# Patient Record
Sex: MALE | Race: WHITE | NOT HISPANIC OR LATINO | Employment: FULL TIME | URBAN - METROPOLITAN AREA
[De-identification: names, ages, dates, MRNs, and addresses within clinical notes are randomized per-mention and may not be internally consistent; named-entity substitution may affect disease eponyms.]

---

## 2024-06-29 PROBLEM — L72.3 SEBACEOUS CYST: Status: ACTIVE | Noted: 2024-06-29

## 2024-06-29 PROBLEM — Z86.59 HISTORY OF DEPRESSION: Status: ACTIVE | Noted: 2024-06-29

## 2024-06-29 PROBLEM — R22.2 MASS OF CHEST WALL, RIGHT: Status: ACTIVE | Noted: 2024-06-29

## 2024-06-29 PROBLEM — Z86.59 HISTORY OF ADHD: Status: ACTIVE | Noted: 2024-06-29

## 2024-06-29 RX ORDER — ACETAMINOPHEN 325 MG/1
TABLET ORAL EVERY 4 HOURS PRN
COMMUNITY

## 2024-06-29 RX ORDER — KETOROLAC TROMETHAMINE 10 MG/1
TABLET, FILM COATED ORAL 2 TIMES DAILY
COMMUNITY
Start: 2022-07-05

## 2024-07-03 ENCOUNTER — HOSPITAL ENCOUNTER (OUTPATIENT)
Dept: RADIOLOGY | Facility: HOSPITAL | Age: 29
Discharge: HOME | End: 2024-07-03
Payer: COMMERCIAL

## 2024-07-03 ENCOUNTER — APPOINTMENT (OUTPATIENT)
Dept: PRIMARY CARE | Facility: CLINIC | Age: 29
End: 2024-07-03
Payer: COMMERCIAL

## 2024-07-03 VITALS
TEMPERATURE: 97.5 F | DIASTOLIC BLOOD PRESSURE: 70 MMHG | RESPIRATION RATE: 16 BRPM | HEIGHT: 71 IN | WEIGHT: 141 LBS | SYSTOLIC BLOOD PRESSURE: 96 MMHG | BODY MASS INDEX: 19.74 KG/M2 | HEART RATE: 59 BPM | OXYGEN SATURATION: 96 %

## 2024-07-03 DIAGNOSIS — M25.522 LEFT ELBOW PAIN: ICD-10-CM
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
## 2024-07-03 DIAGNOSIS — M54.41 ACUTE RIGHT-SIDED LOW BACK PAIN WITH RIGHT-SIDED SCIATICA: ICD-10-CM

## 2024-07-03 DIAGNOSIS — S50.02XA CONTUSION OF LEFT ELBOW, INITIAL ENCOUNTER: ICD-10-CM

## 2024-07-03 DIAGNOSIS — Z23 NEED FOR TDAP VACCINATION: ICD-10-CM

## 2024-07-03 PROCEDURE — 90715 TDAP VACCINE 7 YRS/> IM: CPT | Performed by: FAMILY MEDICINE

## 2024-07-03 PROCEDURE — 90471 IMMUNIZATION ADMIN: CPT | Performed by: FAMILY MEDICINE

## 2024-07-03 PROCEDURE — 72100 X-RAY EXAM L-S SPINE 2/3 VWS: CPT | Performed by: RADIOLOGY

## 2024-07-03 PROCEDURE — 73080 X-RAY EXAM OF ELBOW: CPT | Mod: LT

## 2024-07-03 PROCEDURE — 72100 X-RAY EXAM L-S SPINE 2/3 VWS: CPT

## 2024-07-03 PROCEDURE — 99214 OFFICE O/P EST MOD 30 MIN: CPT | Performed by: FAMILY MEDICINE
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
## 2024-09-24 ENCOUNTER — APPOINTMENT (OUTPATIENT)
Dept: RADIOLOGY | Facility: HOSPITAL | Age: 29
End: 2024-09-24
Payer: COMMERCIAL

## 2024-09-24 ENCOUNTER — HOSPITAL ENCOUNTER (EMERGENCY)
Facility: HOSPITAL | Age: 29
Discharge: HOME | End: 2024-09-24
Attending: EMERGENCY MEDICINE
Payer: COMMERCIAL

## 2024-09-24 VITALS
HEIGHT: 71 IN | SYSTOLIC BLOOD PRESSURE: 119 MMHG | RESPIRATION RATE: 17 BRPM | OXYGEN SATURATION: 98 % | HEART RATE: 62 BPM | TEMPERATURE: 97.2 F | WEIGHT: 140 LBS | BODY MASS INDEX: 19.6 KG/M2 | DIASTOLIC BLOOD PRESSURE: 73 MMHG

## 2024-09-24 DIAGNOSIS — S61.210A LACERATION OF RIGHT INDEX FINGER WITHOUT FOREIGN BODY WITHOUT DAMAGE TO NAIL, INITIAL ENCOUNTER: Primary | ICD-10-CM

## 2024-09-24 PROCEDURE — 2500000005 HC RX 250 GENERAL PHARMACY W/O HCPCS

## 2024-09-24 PROCEDURE — 73140 X-RAY EXAM OF FINGER(S): CPT | Mod: RIGHT SIDE | Performed by: RADIOLOGY

## 2024-09-24 PROCEDURE — 73140 X-RAY EXAM OF FINGER(S): CPT | Mod: RT

## 2024-09-24 PROCEDURE — 12001 RPR S/N/AX/GEN/TRNK 2.5CM/<: CPT

## 2024-09-24 PROCEDURE — 99285 EMERGENCY DEPT VISIT HI MDM: CPT | Performed by: EMERGENCY MEDICINE

## 2024-09-24 PROCEDURE — 12001 RPR S/N/AX/GEN/TRNK 2.5CM/<: CPT | Performed by: EMERGENCY MEDICINE

## 2024-09-24 PROCEDURE — 99284 EMERGENCY DEPT VISIT MOD MDM: CPT | Mod: 25 | Performed by: EMERGENCY MEDICINE

## 2024-09-24 RX ORDER — LIDOCAINE HYDROCHLORIDE 10 MG/ML
5 INJECTION, SOLUTION EPIDURAL; INFILTRATION; INTRACAUDAL; PERINEURAL ONCE
Status: COMPLETED | OUTPATIENT
Start: 2024-09-24 | End: 2024-09-24

## 2024-09-24 RX ORDER — DOXYCYCLINE 100 MG/1
100 CAPSULE ORAL 2 TIMES DAILY
Qty: 10 CAPSULE | Refills: 0 | Status: SHIPPED | OUTPATIENT
Start: 2024-09-24 | End: 2024-09-29

## 2024-09-24 ASSESSMENT — PAIN SCALES - GENERAL
PAINLEVEL_OUTOF10: 0 - NO PAIN
PAINLEVEL_OUTOF10: 6
PAINLEVEL_OUTOF10: 0 - NO PAIN

## 2024-09-24 ASSESSMENT — PAIN DESCRIPTION - LOCATION: LOCATION: FINGER (COMMENT WHICH ONE)

## 2024-09-24 ASSESSMENT — LIFESTYLE VARIABLES
TOTAL SCORE: 0
HAVE PEOPLE ANNOYED YOU BY CRITICIZING YOUR DRINKING: NO
EVER HAD A DRINK FIRST THING IN THE MORNING TO STEADY YOUR NERVES TO GET RID OF A HANGOVER: NO
HAVE YOU EVER FELT YOU SHOULD CUT DOWN ON YOUR DRINKING: NO
EVER FELT BAD OR GUILTY ABOUT YOUR DRINKING: NO

## 2024-09-24 ASSESSMENT — COLUMBIA-SUICIDE SEVERITY RATING SCALE - C-SSRS
2. HAVE YOU ACTUALLY HAD ANY THOUGHTS OF KILLING YOURSELF?: NO
6. HAVE YOU EVER DONE ANYTHING, STARTED TO DO ANYTHING, OR PREPARED TO DO ANYTHING TO END YOUR LIFE?: NO
1. IN THE PAST MONTH, HAVE YOU WISHED YOU WERE DEAD OR WISHED YOU COULD GO TO SLEEP AND NOT WAKE UP?: NO

## 2024-09-24 ASSESSMENT — PAIN DESCRIPTION - FREQUENCY: FREQUENCY: INTERMITTENT

## 2024-09-24 ASSESSMENT — PAIN - FUNCTIONAL ASSESSMENT
PAIN_FUNCTIONAL_ASSESSMENT: 0-10
PAIN_FUNCTIONAL_ASSESSMENT: 0-10

## 2024-09-24 ASSESSMENT — PAIN DESCRIPTION - DESCRIPTORS: DESCRIPTORS: THROBBING

## 2024-09-24 NOTE — ED PROVIDER NOTES
EMERGENCY DEPARTMENT ENCOUNTER      Pt Name: Winsome Reyez  MRN: [id number]  Birthdate 1995  Date of evaluation: 9/24/2024    HISTORY OF PRESENT ILLNESS    Winsome Reyez is an 29 y.o. male no pertinent medical history presenting to the emergency department for injury to right hand.  Patient was working at his manufacturing job.  Wire was being pushed out of a machine accidentally cutting his right index finger.  He states the wire did get wrapped around and became tight at 1 point.  Patient was able to control the bleeding with pressure.  He states that there is no decrease sensation.  Patient denies any other injuries.  Last tetanus shot was on 7/3/2024.      PAST MEDICAL HISTORY     Past Medical History:   Diagnosis Date    Personal history of other diseases of the nervous system and sense organs     History of corneal ulcer       SURGICAL HISTORY       Past Surgical History:   Procedure Laterality Date    OTHER SURGICAL HISTORY  06/08/2022    Tonsillectomy    OTHER SURGICAL HISTORY  06/08/2022    [place] tooth extraction    OTHER SURGICAL HISTORY  07/22/2022    Cyst excision       CURRENT MEDICATIONS       Discharge Medication List as of 9/24/2024 11:49 AM        CONTINUE these medications which have NOT CHANGED    Details   acetaminophen (TylenoL) 325 mg tablet Take by mouth every 4 hours if needed., Historical Med      ketorolac (Toradol) 10 mg tablet Take by mouth twice a day., Starting Tue 7/5/2022, Historical Med             ALLERGIES     Penicillins    FAMILY HISTORY       Family History   Problem Relation Name Age of Onset    Cancer Mother      Diabetes Other grandparent         SOCIAL HISTORY       Social History     Socioeconomic History    Marital status: Single   Tobacco Use    Smoking status: Some Days     Types: Cigarettes    Smokeless tobacco: Never   Vaping Use    Vaping status: Never Used   Substance and Sexual Activity    Alcohol use: Never    Drug use: Never       PHYSICAL EXAM       ED Triage  Vitals [09/24/24 0727]   Temperature Heart Rate Respirations BP   36.2 °C (97.2 °F) 63 16 115/65      Pulse Ox Temp Source Heart Rate Source Patient Position   100 % Temporal Monitor Sitting      BP Location FiO2 (%)     Right arm --       Physical Exam  Vitals and nursing note reviewed.   Constitutional:       General: He is not in acute distress.     Appearance: He is well-developed.   HENT:      Head: Normocephalic and atraumatic.   Cardiovascular:      Rate and Rhythm: Normal rate and regular rhythm.      Heart sounds: No murmur heard.  Pulmonary:      Effort: Pulmonary effort is normal. No respiratory distress.      Breath sounds: Normal breath sounds.   Musculoskeletal:      Cervical back: Neck supple.      Comments: Full range of motion of the right index finger for flexion and extension minimal swelling   Skin:     General: Skin is warm and dry.      Capillary Refill: Capillary refill takes less than 2 seconds.      Comments: Laceration on the palmar surface of the right index finger 1.5 cm long, minimal 3 mm deep, visualization of tendon with partial tear.   Neurological:      Mental Status: He is alert.      Sensory: No sensory deficit.          DIAGNOSTIC RESULTS     LABS:  Labs Reviewed - No data to display    All other labs were within normal range or not returned as of this dictation.    Imaging  XR fingers right 2+ views   Final Result   No evidence of fracture.        MACRO:   None.        Signed by: Rosa Maria Vargas 9/24/2024 8:32 AM   Dictation workstation:   [id number]           Procedures  Laceration Repair    Performed by: Sherry Glaser DO  Authorized by: Clement Cruz DO    Consent:     Consent obtained:  Verbal    Consent given by:  Patient    Risks discussed:  Infection, poor cosmetic result and poor wound healing  Anesthesia:     Anesthesia method:  Nerve block    Block location:  Right index finger    Block needle gauge:  25 G    Block anesthetic:  Lidocaine 1% w/o epi    Block  technique:  Digital block    Block injection procedure:  Anatomic landmarks identified    Block outcome:  Anesthesia achieved  Laceration details:     Location:  Finger    Finger location:  R index finger    Length (cm):  1.5    Depth (mm):  3  Exploration:     Wound extent: tendon damage      Wound extent: no muscle damage noted and no underlying fracture noted      Tendon damage location:  Upper extremity    Upper extremity tendon damage location:  Finger flexor    Finger flexor tendon:  Flexor digitorum profundus    Tendon damage extent:  Partial transection    Tendon repair plan:  Refer for evaluation    Contaminated: no    Treatment:     Area cleansed with:  Povidone-iodine    Irrigation solution:  Sterile saline    Irrigation method:  Pressure wash  Skin repair:     Repair method:  Sutures    Suture size:  4-0    Suture material:  Prolene    Suture technique:  Simple interrupted    Number of sutures:  5  Approximation:     Approximation:  Close  Repair type:     Repair type:  Simple  Post-procedure details:     Dressing:  Splint for protection and non-adherent dressing    Procedure completion:  Tolerated well, no immediate complications       EMERGENCY DEPARTMENT COURSE/MDM:   Medical Decision Making  Winsome Reyez is an 29 y.o. male no pertinent medical history presenting to the emergency department for injury to right hand.  Due to the injury being caused by metal wrapping around the finger x-ray imaging ordered to make sure there is no evidence of fracturing.  Patient's tetanus shot is already up-to-date does not require another shot today.    X-ray imaging reviewed shows no acute fracturing.  Area was thoroughly cleaned at bedside.  Just prior to suturing wound was thoroughly explored and there was evidence of a partial tendon tear.  After repair was completed patient's finger was immobilized with a splint and he is given follow-up with hand.  Because of the dirty nature of the wound patient given a 5-day  course of antibiotics for prophylactic infection prevention.  Patient was amenable to the plan.        Diagnoses as of 09/24/24 1919   Laceration of right index finger without foreign body without damage to nail, initial encounter        External records reviewed: recent inpatient, clinic, and prior ED notes  Labs and Diagnostic imaging independently reviewed/interpreted by me.    Patient plan, care, lab results and imaging were all discussed with attending.    ED Medications administered this visit:    Medications   lidocaine PF (Xylocaine) 10 mg/mL (1 %) injection 50 mg (50 mg infiltration Given 9/24/24 0845)     New Prescriptions from this visit:    Discharge Medication List as of 9/24/2024 11:49 AM        START taking these medications    Details   doxycycline (Vibramycin) 100 mg capsule Take 1 capsule (100 mg) by mouth 2 times a day for 5 days. Take with at least 8 ounces (large glass) of water, do not lie down for 30 minutes after, Starting Tue 9/24/2024, Until Sun 9/29/2024, Normal             (Please note that portions of this note were completed with a voice recognition program.  Efforts were made to edit the dictations but occasionally words are mis-transcribed.)     Sherry Glaser DO  Resident  09/24/24 1919

## 2024-09-24 NOTE — DISCHARGE INSTRUCTIONS
Keep your finger immobilized until you follow-up with orthopedics.  There is concern he may have a partial tendon injury.    Seek immediate medical attention if your wound develops: redness, swelling, warmth to touch, discharge or drainage, increasing pain, or any new or worsening symptoms.    Follow-up with the hand surgeon/orthopedic surgeon to have your sutures removed.  Sutures will typically be removed in 10 to 12 days.

## 2024-09-26 ENCOUNTER — OFFICE VISIT (OUTPATIENT)
Dept: ORTHOPEDIC SURGERY | Facility: CLINIC | Age: 29
End: 2024-09-26
Payer: COMMERCIAL

## 2024-09-26 DIAGNOSIS — S66.801A INJURY OF FLEXOR TENDON OF HAND, RIGHT, INITIAL ENCOUNTER: ICD-10-CM

## 2024-09-26 NOTE — PROGRESS NOTES
History of Present Illness:  Patient presents with right index finger hand wound.  This occurred on 9/24/2024 at work with a sharp wire.   They were seen at [facility], where antibiotics were given and tetanus was updated.  Currently endorsing improving pain.     Sutured in the emergency room.  Per ER partial tendon laceration    Review of Systems   GENERAL: Negative for malaise, significant weight loss, fever  MUSCULOSKELETAL: see HPI  NEURO:  Negative    The patient's past medical history, family history, social history, and review of systems were reviewed. History is otherwise negative except as stated in the HPI.    Physical Examination:  General: Alert and oriented to person, place, and time.  No acute distress and breathing comfortably: Pleasant and cooperative with examination.  HEENT: Head is normocephalic and atraumatic.  Neck: Supple, no visible swelling.  Cardiovascular: No palpable tachycardia  Lungs: No audible wheezing or labored breathing  Abdomen: Nondistended.    On musculoskeletal examination,   Laceration present over the 2cm transverse laceration over mid aspect of P2.  This is 2centimeters in length.  Sensation tact light touch distally in both digital nerve distributions.  He is able to flex at the PIP and DIP against resistance    Imaging:  Radiographic notes: no acute osseous process    Assessment:  Patient with right index hand laceration    Plan:  On exam, they are neurovascularly intact.  Per ER partial tendon laceration but he is neurovascularly intact today.  Discussed at length that this could have been a partial FDS that would not affect the mobility of the finger given the FDP and second head of FDS.  Tetanus was updated and there is no evidence of infection.  Recommend soft dressing and suture removal if necessary.  I would like them to use their hand and work on range of motion to avoid stiffness.  Educated them on concerning signs of infection.  These include erythema, drainage,  increasing pain, constitutional symptoms.    I would like to get him into occupational therapy to work on range of motion.  Maintain nonweightbearing status on this side.  Additionally have gotten him into a dorsal blocking splint and have encouraged flexion to prevent stiffness      Follow up: 1 week for suture removal and clinical check    Zenaida Bridges MD

## 2024-10-04 ENCOUNTER — TELEPHONE (OUTPATIENT)
Dept: PHYSICAL THERAPY | Facility: CLINIC | Age: 29
End: 2024-10-04
Payer: COMMERCIAL

## 2024-10-07 ENCOUNTER — OFFICE VISIT (OUTPATIENT)
Dept: ORTHOPEDIC SURGERY | Facility: CLINIC | Age: 29
End: 2024-10-07
Payer: COMMERCIAL

## 2024-10-07 DIAGNOSIS — S66.801A INJURY OF FLEXOR TENDON OF HAND, RIGHT, INITIAL ENCOUNTER: Primary | ICD-10-CM

## 2024-10-07 PROCEDURE — 99213 OFFICE O/P EST LOW 20 MIN: CPT | Performed by: STUDENT IN AN ORGANIZED HEALTH CARE EDUCATION/TRAINING PROGRAM

## 2024-10-07 NOTE — LETTER
October 7, 2024     Patient: Winsome Reyez   YOB: 1995   Date of Visit: 10/7/2024       To Whom It May Concern:    It is my medical opinion that Winsome Reyez  can return to work with a 10 lb  no lifting restriction .    If you have any questions or concerns, please don't hesitate to call.         Sincerely,        Zenaida Bridges MD

## 2024-10-07 NOTE — PROGRESS NOTES
History of Present Illness  Patient returns today for evaluation of right index finger.  Has been working on flexion and is 1 cm tip to palm distance.  Does continue to have numbness in the radial digital nerve distribution.     Physical Examination:  Right upper extremity:  The patient appears to be their stated age, is in no apparent distress, and is oriented x3. The patients mood and affect are appropriate. The patients gait is normal. The examination of the limb in question was performed in comparison to the contralateral limb.    On musculoskeletal examination, sutures removed today and well-healed laceration.  1 cm tip to palm distance.  Full flexion strength.  Does lack 20 degrees of extension at the PIP    Sensation and motor function are intact in the radial, and median nerve distribution. There is no obvious thenar atrophy, and thenar strength is 5/5. There is no intrinsic atrophy, and intrinsic strength is 5/5.  The patient can make a full composite fist. The hand itself is warm and well perfused. The skin is intact throughout. The contralateral hand/wrist are normal to inspection, range of motion, stability, and strength.        Assessment:  Patient with right index hand laceration and partial tendon injury.    Plan:   He is doing very well working on motion.  He has occupational therapy scheduled for later this week.  Okay to progress weightbearing status.  5 pound weightbearing restriction on this side.  No longer needs to wear dorsal blocking splint.  Follow-up in 4 weeks for clinical check and progression of weightbearing    Zenaida Carter MD

## 2024-10-11 ENCOUNTER — EVALUATION (OUTPATIENT)
Dept: OCCUPATIONAL THERAPY | Facility: CLINIC | Age: 29
End: 2024-10-11
Payer: COMMERCIAL

## 2024-10-11 DIAGNOSIS — S66.801A INJURY OF FLEXOR TENDON OF HAND, RIGHT, INITIAL ENCOUNTER: ICD-10-CM

## 2024-10-11 PROCEDURE — 97110 THERAPEUTIC EXERCISES: CPT | Mod: GO | Performed by: OCCUPATIONAL THERAPIST

## 2024-10-11 PROCEDURE — 97165 OT EVAL LOW COMPLEX 30 MIN: CPT | Mod: GO | Performed by: OCCUPATIONAL THERAPIST

## 2024-10-11 ASSESSMENT — PAIN SCALES - GENERAL: PAINLEVEL_OUTOF10: 0 - NO PAIN

## 2024-10-11 ASSESSMENT — PAIN - FUNCTIONAL ASSESSMENT: PAIN_FUNCTIONAL_ASSESSMENT: 0-10

## 2024-10-11 NOTE — PROGRESS NOTES
Occupational Therapy    Occupational Therapy Evaluation    Name: Winsome Reyez  MRN: [id number]  : 1995   DATE: 10/11/24   Time Calculation  Start Time: 0130  Stop Time: 0200  Time Calculation (min): 30 min     OT Evaluation Time Entry  OT Evaluation (Low) Time Entry: 15  OT Therapeutic Procedures Time Entry  Manual Therapy Time Entry: 5  Therapeutic Exercise Time Entry: 10      Insurance Authorization Request: [facility] OT 12V -     Assessment:  Patient is a 29 y.o. male who is 2 weeks 3 days s/p right IF laceration that is treated conservatively.     Patient presented with scar, joint stiffness, and capsular tightness. He resides home lone independently, works in [employer]. Meaningful leisure activities are riding motorcycle and playing pool. Educated patient on scar mob with Dyce[employer]. Started patient on digits AROM exercises to decrease stiffness and tightness. Patient will benefit from skilled OT for ROM and progressive strengthening to support return to all ADL/ IADL, work, and leisure activities.     Plan:  Outpatient Plan  OT Plan: Modalities, therapeutic exercise, therapeutic activity, manual therapy, neuromotor re-ed, manual therapy  Frequency: 2x/wk  Duration: 6 weeks  Rehab Potential: Good  Plan of Care Agreement: Patient    Subjective   Current Problem:  right index finger laceration      DOI: 2024    Surgical Procedure: none  DOS: n/a  Hand Dominance: right   Affected Extremity: right    Mechanism of Injury: Patient's finger got caught in the machine at work.     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#, tool use     Meaningful Activities: Ride motorcycle, shooting pools     Previous Level of Function Per Patient/Caregiver Report: Independent with ADL, IADL, work and leisure activities    Chief complaint:  "Stiffness     Patient stated goals for therapy: \"Be able to bend my finger again and be able to .\"      Objective   UE Assessment  Observation: Mini edema along right IF    Palpation: No tenderness upon palpation     Range of Motion (in degrees)  Shoulder AROM: WNL     PROM:  WNL    Elbow AROM:WNL       PROM: WNL    Wrist AROM: WNL     PROM: WNL    Digits AROM: IF MCP 0/80, PIP 0/85, DIP 0/40     PROM: Same with AROM    Thumb AROM: WNL   PROM:  WNL    Sensation: Numbness and tingling along radial side of IF distal to PIP joint      Strength: TBT   strength: R  L  Lateral pinch strength: R  L  3 point pinch strength: R  L     Treatment Performed: Instructed patient to self perform active sign language in conjunction with fluido therapy.     Outcome Measures:  Quick Dash Score: at eval 9.09%    OP EDUCATION:  Education  Individual(s) Educated: Patient  Education Provided: Risk and benefits of OT discussed with patient or other, POC discussed and agreed upon  Home Program: AROM, Handout issued  Risk and Benefits Discussed with Patient/Caregiver/Other: yes  Patient/Caregiver Demonstrated Understanding: yes  Plan of Care Discussed and Agreed Upon: yes  Patient Response to Education: Patient/Caregiver Verbalized Understanding of Information, Patient/Caregiver Performed Return Demonstration of Exercises/Activities, Patient/Caregiver Asked Appropriate Questions     Goals:  By discharge (6 weeks) Winsome Reyez  will achieve the following goals:     1. Patient to demonstrate AROM IF DIP 60 degrees flexion for dressing and grooming  2. Patient to lift and carry 20# with right hand with no difficulty for work tasks  3. Patient to demonstrate  strength right hand same with left side for motorcycle riding   4. Patient to demonstrate proper technique and competence with HEP   5. Patient to score disability rate less than 0% on Quick DASH          "

## 2024-10-17 ENCOUNTER — TREATMENT (OUTPATIENT)
Dept: OCCUPATIONAL THERAPY | Facility: CLINIC | Age: 29
End: 2024-10-17
Payer: COMMERCIAL

## 2024-10-17 DIAGNOSIS — S61.210D: Primary | ICD-10-CM

## 2024-10-17 PROCEDURE — 97110 THERAPEUTIC EXERCISES: CPT | Mod: GO | Performed by: OCCUPATIONAL THERAPIST

## 2024-10-17 ASSESSMENT — PAIN SCALES - GENERAL: PAINLEVEL_OUTOF10: 0 - NO PAIN

## 2024-10-17 ASSESSMENT — PAIN - FUNCTIONAL ASSESSMENT: PAIN_FUNCTIONAL_ASSESSMENT: 0-10

## 2024-10-17 NOTE — PROGRESS NOTES
"Occupational Therapy     Occupational Therapy Treatment  Name: Winsome Reyez   MRN: [id number]   : 1995   Date:  10/17/2024    Time Calculation  Start Time: 0300  Stop Time: 0340  Time Calculation (min): 40 min      OT Therapeutic Procedures Time Entry  Therapeutic Exercise Time Entry: 40       Current Problem:   right index finger laceration       DOI: 2024    Surgical Procedure: none  DOS: n/a  Hand Dominance: right   Affected Extremity: right    Visit Number: 2    Insurance Authorization Request: Health system OT 12V -     Assessment:  Patient is progressing appropriately demonstrated by improved AROM of IF into flexion. He remains limited by pain and muscle weakness. Added putty exercises to today's session to promote  strength. Patient will continue to benefit from skilled OT for pain management and progressive strengthening to support full return to all ADL/IADL, work, and leisure activities.       Plan:  Outpatient Plan  Frequency: 2x/wk  Duration: 6 weeks  Rehab Potential: Good  Plan of Care Agreement: Patient      Subjective   General: \"I have been getting back to using my hand for everything and it has been more sore. \"    Precautions: 5# lifting restriction     Pain Assessment:  Location: right IF PIP joint   0/10 at rest, 2/10 at highest  Description: discomfort       HEP Adherence/Understanding: Good     Objective  UE Assessment  Observation: Mini edema along right IF     Palpation: No tenderness upon palpation      Range of Motion (in degrees)  Shoulder AROM: WNL     PROM:  WNL     Elbow AROM:WNL       PROM: WNL     Wrist AROM: WNL     PROM: WNL     Digits AROM: IF MCP 0/90 (was 80), PIP 0/95 (was 85), DIP 0/50 (was 40)     PROM: Same with AROM     Thumb AROM: WNL   PROM:  WNL     Sensation: No more numbness/tingling but reports nerve pain comes and goes in fingertip      Strength:    strength: R 60#,  L 80#    Treatment Interventions:   Therapeutic Exercise  Therapeutic Exercise " Performed: Yes    Active sign language in conjunction with fluido therapy  Bazan putty  with forearm in various positions  Tan putty rolling, pressing  L bar tan putty pressing          Tolerance of session: No difficulty     Outcome Measures:  Quick Dash Score: at eval 9.09%     OP EDUCATION:  Education  Individual(s) Educated: Patient  Home Program: Strengthening  Risk and Benefits Discussed with Patient/Caregiver/Other: yes  Patient/Caregiver Demonstrated Understanding: yes  Plan of Care Discussed and Agreed Upon: yes  Patient Response to Education: Patient/Caregiver Verbalized Understanding of Information, Patient/Caregiver Performed Return Demonstration of Exercises/Activities, Patient/Caregiver Asked Appropriate Questions    Goals:   By discharge (6 weeks) Winsome Reyez  will achieve the following goals:      1. Patient to demonstrate AROM IF DIP 60 degrees flexion for dressing and grooming  2. Patient to lift and carry 20# with right hand with no difficulty for work tasks  3. Patient to demonstrate  strength right hand same with left side for motorcycle riding   4. Patient to demonstrate proper technique and competence with HEP   5. Patient to score disability rate less than 0% on Quick DASH

## 2024-10-22 ENCOUNTER — TREATMENT (OUTPATIENT)
Dept: OCCUPATIONAL THERAPY | Facility: CLINIC | Age: 29
End: 2024-10-22
Payer: COMMERCIAL

## 2024-10-22 DIAGNOSIS — S61.210D: Primary | ICD-10-CM

## 2024-10-22 PROCEDURE — 97110 THERAPEUTIC EXERCISES: CPT | Mod: GO | Performed by: OCCUPATIONAL THERAPIST

## 2024-10-22 ASSESSMENT — PAIN SCALES - GENERAL: PAINLEVEL_OUTOF10: 0 - NO PAIN

## 2024-10-22 ASSESSMENT — PAIN - FUNCTIONAL ASSESSMENT: PAIN_FUNCTIONAL_ASSESSMENT: 0-10

## 2024-10-22 NOTE — PROGRESS NOTES
"Occupational Therapy     Occupational Therapy Treatment  Name: Winsome Reyez   MRN: [id number]   : 1995   Date:  10/22/2024    Time Calculation  Start Time: 0430  Stop Time: 0508  Time Calculation (min): 38 min      OT Therapeutic Procedures Time Entry  Therapeutic Exercise Time Entry: 38       Current Problem:   right index finger laceration       DOI: 2024    Surgical Procedure: none  DOS: n/a  Hand Dominance: right   Affected Extremity: right    Visit Number: 3    Insurance Authorization Request: [facility] OT 12V -     Assessment:  Patient is progressing appropriately demonstrated by decreased pain with increased functional use of right hand. He remains limited by pain and muscle weakness. Progressed putty exercises to medium soft today to promote  strength. Patient will continue to benefit from skilled OT for progressive strengthening to support full return to all ADL/IADL, work, and leisure activities.       Plan:  Outpatient Plan  OT Plan: Progress resistance training  Frequency: 2x/wk  Duration: 6 weeks  Rehab Potential: Good  Plan of Care Agreement: Patient      Subjective   General: \"My hand is doing better, it is not as sore as before.\"    Precautions: 5# lifting restriction     Pain Assessment:  Location: right IF PIP joint   0/10 at rest, 2/10 at highest  Description: discomfort       HEP Adherence/Understanding: Good     Objective  UE Assessment  Observation: Mini edema along right IF     Palpation: No tenderness upon palpation      Range of Motion (in degrees)  Shoulder AROM: WNL     PROM:  WNL     Elbow AROM:WNL       PROM: WNL     Wrist AROM: WNL     PROM: WNL     Digits AROM: IF MCP 0/90, PIP 0/95, DIP 0/50     PROM: Same with AROM     Thumb AROM: WNL   PROM:  WNL     Sensation: No more numbness/tingling but reports nerve pain comes and goes in fingertip      Strength:    strength: R 60#,  L 80#    Treatment Interventions:   Therapeutic Exercise  Therapeutic Exercise " Performed: Yes    Active sign language in conjunction with fluido therapy  Red putty  with forearm in various positions  L bar red putty pressing    Thick yellow flexbar twist 2 ways, bend 2 ways        Tolerance of session: No difficulty     Outcome Measures:  Quick Dash Score: at eval 9.09%     OP EDUCATION:  Education  Individual(s) Educated: Patient  Home Program: Strengthening  Risk and Benefits Discussed with Patient/Caregiver/Other: yes  Patient/Caregiver Demonstrated Understanding: yes  Plan of Care Discussed and Agreed Upon: yes  Patient Response to Education: Patient/Caregiver Verbalized Understanding of Information, Patient/Caregiver Performed Return Demonstration of Exercises/Activities, Patient/Caregiver Asked Appropriate Questions    Goals:   By discharge (6 weeks) Winsome RALEIGH Dereje  will achieve the following goals:      1. Patient to demonstrate AROM IF DIP 60 degrees flexion for dressing and grooming  2. Patient to lift and carry 20# with right hand with no difficulty for work tasks  3. Patient to demonstrate  strength right hand same with left side for motorcycle riding   4. Patient to demonstrate proper technique and competence with HEP   5. Patient to score disability rate less than 0% on Quick DASH

## 2024-10-25 ENCOUNTER — TREATMENT (OUTPATIENT)
Dept: OCCUPATIONAL THERAPY | Facility: CLINIC | Age: 29
End: 2024-10-25
Payer: COMMERCIAL

## 2024-10-25 DIAGNOSIS — S61.210D: Primary | ICD-10-CM

## 2024-10-25 PROCEDURE — 97110 THERAPEUTIC EXERCISES: CPT | Mod: GO | Performed by: OCCUPATIONAL THERAPIST

## 2024-10-25 ASSESSMENT — PAIN SCALES - GENERAL: PAINLEVEL_OUTOF10: 0 - NO PAIN

## 2024-10-25 ASSESSMENT — PAIN - FUNCTIONAL ASSESSMENT: PAIN_FUNCTIONAL_ASSESSMENT: 0-10

## 2024-10-25 NOTE — PROGRESS NOTES
"Occupational Therapy     Occupational Therapy Treatment/Discharge  Name: Winsome Reyez   MRN: [id number]   : 1995   Date:  10/25/2024    Time Calculation  Start Time: 105  Stop Time: 130  Time Calculation (min): 25 min      OT Therapeutic Procedures Time Entry  Therapeutic Exercise Time Entry: 25       Current Problem:   right index finger laceration       DOI: 2024    Surgical Procedure: none  DOS: n/a  Hand Dominance: right   Affected Extremity: right    Visit Number: 4    Insurance Authorization Request: [facility] [place] OT 12V -     Assessment:  Patient demonstrates full IF range of motion, increasing  strength, and reports return to all daily activities with no difficulty. Progressed resistance training to green putty during today's session for  and pinch strength. All questions were answered and discharge patient at this time.     Plan:  Outpatient Plan  OT Plan: Discharge patient to [facility]  Plan of Care Agreement: Patient      Subjective   General: \"I am lifting 50# items at work and has no difficulty with it.\" \"I still feel it when I tap on the scar but otherwise I am using my hand like normal.\"     Precautions: 5# lifting restriction     Pain Assessment:  Location: right IF PIP joint   0/10 at rest, 2/10 at highest  Description: discomfort       HEP Adherence/Understanding: Good     Objective  UE Assessment  Observation: Mini edema along right IF     Palpation: No tenderness upon palpation      Range of Motion (in degrees)  Shoulder AROM: WNL     PROM:  WNL     Elbow AROM:WNL       PROM: WNL     Wrist AROM: WNL     PROM: WNL     Digits AROM: IF MCP 0/90, PIP 0/95, DIP 0/60     PROM: Same with AROM     Thumb AROM: WNL   PROM:  WNL     Sensation: No more numbness/tingling but reports nerve pain comes and goes in fingertip      Strength:    strength: R 80# (was 60#),  L 80#    Treatment Interventions:   Therapeutic Exercise  Therapeutic Exercise Performed: Yes    Active sign language in " conjunction with fluido therapy  Green putty  with forearm in various positions  Green putty rolling        Tolerance of session: No difficulty     Outcome Measures:  Quick Dash Score: at eval 9.09%, at discharge 0%     OP EDUCATION:  Education  Individual(s) Educated: Patient  Home Program: Strengthening  Risk and Benefits Discussed with Patient/Caregiver/Other: yes  Patient/Caregiver Demonstrated Understanding: yes  Plan of Care Discussed and Agreed Upon: yes  Patient Response to Education: Patient/Caregiver Verbalized Understanding of Information, Patient/Caregiver Performed Return Demonstration of Exercises/Activities, Patient/Caregiver Asked Appropriate Questions    Goals:   By discharge (6 weeks) Winsome RALEIGH Dereje  will achieve the following goals:      1. Patient to demonstrate AROM IF DIP 60 degrees flexion for dressing and grooming (met)  2. Patient to lift and carry 20# with right hand with no difficulty for work tasks (met)  3. Patient to demonstrate  strength right hand same with left side for motorcycle riding  (met)  4. Patient to demonstrate proper technique and competence with HEP (met)  5. Patient to score disability rate less than 0% on Quick DASH (met)

## 2024-10-29 ENCOUNTER — APPOINTMENT (OUTPATIENT)
Dept: OCCUPATIONAL THERAPY | Facility: CLINIC | Age: 29
End: 2024-10-29
Payer: COMMERCIAL

## 2024-11-01 ENCOUNTER — APPOINTMENT (OUTPATIENT)
Dept: OCCUPATIONAL THERAPY | Facility: CLINIC | Age: 29
End: 2024-11-01
Payer: COMMERCIAL

## 2024-11-04 ENCOUNTER — OFFICE VISIT (OUTPATIENT)
Dept: ORTHOPEDIC SURGERY | Facility: CLINIC | Age: 29
End: 2024-11-04
Payer: COMMERCIAL

## 2024-11-04 DIAGNOSIS — S66.801A INJURY OF FLEXOR TENDON OF HAND, RIGHT, INITIAL ENCOUNTER: Primary | ICD-10-CM

## 2024-11-04 PROCEDURE — 99213 OFFICE O/P EST LOW 20 MIN: CPT | Performed by: STUDENT IN AN ORGANIZED HEALTH CARE EDUCATION/TRAINING PROGRAM

## 2024-11-04 NOTE — PROGRESS NOTES
History of Present Illness  Patient returns today for evaluation of right index finger 9/24/2024.  Doing very well following occupational therapy.  Some residual numbness to the index finger that is improving.    Physical Examination:  Right upper extremity:  The patient appears to be their stated age, is in no apparent distress, and is oriented x3. The patients mood and affect are appropriate. The patients gait is normal. The examination of the limb in question was performed in comparison to the contralateral limb.    On musculoskeletal examination, sutures removed today and well-healed laceration.  0 cm tip to palm distance.  Full flexion strength.  Full extension.    Sensation and motor function are intact in the radial, and median nerve distribution. There is no obvious thenar atrophy, and thenar strength is 5/5. There is no intrinsic atrophy, and intrinsic strength is 5/5.  The patient can make a full composite fist. The hand itself is warm and well perfused. The skin is intact throughout. The contralateral hand/wrist are normal to inspection, range of motion, stability, and strength.        Assessment:  Patient with right index hand laceration and partial tendon injury.  Doing very well.  Feels he has had almost full return to strength.  Endorsing some fatigue at times.    Plan:   Continues to do very well.  Okay for full activity.  Discussed that fatigue of this finger will still occur up to 3 to 4 months.  Return to work with no restrictions.  Follow-up in 2 months for clinical check    Zenaida Carter MD

## 2024-11-04 NOTE — LETTER
November 4, 2024     Patient: Winsome Reyez   YOB: 1995   Date of Visit: 11/4/2024       To Whom It May Concern:    It is my medical opinion that Winsome Reyez may return to full duty immediately with no restrictions as of 11/4/24.    If you have any questions or concerns, please don't hesitate to call.         Sincerely,        Zenaida Bridges MD

## 2024-11-05 ENCOUNTER — APPOINTMENT (OUTPATIENT)
Dept: OCCUPATIONAL THERAPY | Facility: CLINIC | Age: 29
End: 2024-11-05
Payer: COMMERCIAL

## 2024-11-08 ENCOUNTER — APPOINTMENT (OUTPATIENT)
Dept: OCCUPATIONAL THERAPY | Facility: CLINIC | Age: 29
End: 2024-11-08
Payer: COMMERCIAL

## 2024-11-12 ENCOUNTER — APPOINTMENT (OUTPATIENT)
Dept: OCCUPATIONAL THERAPY | Facility: CLINIC | Age: 29
End: 2024-11-12
Payer: COMMERCIAL

## 2024-11-15 ENCOUNTER — APPOINTMENT (OUTPATIENT)
Dept: OCCUPATIONAL THERAPY | Facility: CLINIC | Age: 29
End: 2024-11-15
Payer: COMMERCIAL

## 2024-11-19 ENCOUNTER — APPOINTMENT (OUTPATIENT)
Dept: OCCUPATIONAL THERAPY | Facility: CLINIC | Age: 29
End: 2024-11-19
Payer: COMMERCIAL

## 2024-11-22 ENCOUNTER — APPOINTMENT (OUTPATIENT)
Dept: OCCUPATIONAL THERAPY | Facility: CLINIC | Age: 29
End: 2024-11-22
Payer: COMMERCIAL

## 2024-11-26 ENCOUNTER — APPOINTMENT (OUTPATIENT)
Dept: OCCUPATIONAL THERAPY | Facility: CLINIC | Age: 29
End: 2024-11-26
Payer: COMMERCIAL